# Patient Record
(demographics unavailable — no encounter records)

---

## 2017-12-22 NOTE — DIAGNOSTIC IMAGING REPORT
L HUMERUS MIN 2 VIEWS ROUTINE



CLINICAL HISTORY: Left upper extremity pain following fall.    



COMPARISON: None



FINDINGS:  There is an acute displaced fracture of the proximal shaft of the

left humerus which is predominantly transverse in orientation. Fracture is

displaced 5 mm. No additional fractures are identified. No underlying lesion is

identified by radiography. Alignment of the left shoulder and elbow appears

anatomic.



IMPRESSION: Acute moderately displaced fracture of the proximal shaft of the

left humerus.







Electronically signed by:  John Zhang M.D.

12/22/2017 9:51 AM



Dictated Date/Time:  12/22/2017 9:48 AM

## 2017-12-22 NOTE — EMERGENCY ROOM VISIT NOTE
History


First contact with patient:  08:53


Chief Complaint:  ARM PAIN


Stated Complaint:  ARM PAIN FROM FALL





History of Present Illness


The patient is a 4Y 3M year old female who presents to the Emergency Room with 

complaints of injuries after she fell off of a little tyke sliding board last 

night, and has had pain overnight.  The mother reports that she complains of 

pain of the left upper arm and shoulder region.  On my exam, the patient 

complains and points to her elbow as the source of her pain.  She denies any 

headache, neck pain or back pain.  The mother has not noticed any shortness of 

breath or other acute findings on exam.  The patient rates her discomfort a 5 

out of 10 on the pediatric pain scale.





Review of Systems


10 system review was performed with the patient and mother, and was negative 

except for pertinent positives and negatives as indicated in history of present 

illness





Past Medical/Surgical History


Medical Problems:


(1) No significant past medical history


Surgical Problems:


(1) No history of previous surgery








Family History





No significant family history





Social History


Smoking Status:  Never Smoker


Alcohol Use:  none


Marital Status:  single


Housing Status:  lives with family


Occupation Status:   / 





Current/Historical Medications


Scheduled


Sodium Fluoride (Fluoride), 0.25 MG PO DAILY





Physical Exam


Vital Signs











  Date Time  Temp Pulse Resp B/P (MAP) Pulse Ox O2 Delivery O2 Flow Rate FiO2


 


12/22/17 12:45  90 18  98   


 


12/22/17 10:38  98 18  99 Room Air  


 


12/22/17 08:47 36.8 103 18 111/74 98 Room Air  











Physical Exam


CONSTITUTIONAL:  Healthy and well nourished.  Patient does not appear in any 

acute distress on exam.


HEENT:  Normocephalic, atraumatic.  Pupils equal, round and reactive.  


NECK:  Full active range of motion without discomfort.


GASTROINTESTINAL:  Bowel sounds present in all quadrants.  Soft and nontender 

to palpation.


MUSCULOSKELETAL: Careful examination shows no worsening pain with flexion, 

extension, pronation or supination of the forearm and elbow.  She also has no 

tenderness about the wrist or hand.  She has no focal tenderness through the 

distal clavicle or acromioclavicular joint.  Distal pulses are intact.


INTEGUMENTARY:  No rash or other significant dermatologic conditions noted.


NEUROLOGIC: Left upper extremity appears to be grossly sensory intact.





Medical Decision & Procedures


ER Provider


Diagnostic Interpretation:


My interpretation of left humerus x-ray shows a proximal humerus fracture with 

a 5 mm displacement.  No other acute shoulder or elbow fractures/dislocations 

noted.  Radiologist report is as follows:





L HUMERUS MIN 2 VIEWS ROUTINE





CLINICAL HISTORY: Left upper extremity pain following fall.    





COMPARISON: None





FINDINGS:  There is an acute displaced fracture of the proximal shaft of the


left humerus which is predominantly transverse in orientation. Fracture is


displaced 5 mm. No additional fractures are identified. No underlying lesion is


identified by radiography. Alignment of the left shoulder and elbow appears


anatomic.





IMPRESSION: Acute moderately displaced fracture of the proximal shaft of the


left humerus.





Medications Administered











 Medications


  (Trade)  Dose


 Ordered  Sig/Dio


 Route  Start Time


 Stop Time Status Last Admin


Dose Admin


 


 Acetaminophen/


 Hydrocodone Bitart


  (Hydrocod/Apap


 Elix 7.5/325MG/


 15ML Home Pack)  1 homepack  UD  ONCE


 PO  12/22/17 12:00


 12/22/17 12:01 DC 12/22/17 12:11


1 HOMEPACK











ED Course


Patient history and physical exam were performed.  Nurse's notes were reviewed.

  Vital signs were reviewed and were normal.  The patient refused any 

analgesics.  X-rays of the left humerus confirms a proximal humerus fracture.  

The case was further discussed with Dr. Mckeon, ED attending physician, who 

suggested consultation with the on-call orthopedic surgeon.  I then spoke with 

Gregg Jeffries PA-C who al o reviewed x-rays with Dr. Marte.  They suggested 

that the patient be referred to a pediatric orthopedic surgeon.  Given the 

patient's insurance being through [facility] insurance, I also elected to call 

[facility] for further guidance.  Dr. Pleitez reviewed x-

rays and came to the emergency department for further evaluation.  The patient 

was ultimately placed in an arm sling and swath after being administered Lortab 

elixir from a home pack, which was also sent home with the family.  The patient 

will follow-up next Thursday with Dr. Pleitez.  The parents were happy with 

plan of care, and the patient denied any significant pain at the time of 

discharge.





Medical Decision








Blood Pressure Screening


Patient's blood pressure:  Normal blood pressure





Impression





 Primary Impression:  


 Closed fracture of left proximal humerus





Departure Information


Referrals


No Doctor, Assigned (PCP)





Patient Instructions


My [facility]





Problem Qualifiers








 Primary Impression:  


 Closed fracture of left proximal humerus


 Encounter type:  initial encounter  Fracture morphology:  other fracture  

Fracture alignment:  displaced  Qualified Codes:  S42.292A - Other displaced 

fracture of upper end of left humerus, initial encounter for closed fracture

## 2017-12-22 NOTE — ORTHOPEDIC CONSULTATION
Orthopedic Consultation


Date of Consultation:


Dec 22, 2017.


Attending Physician:





Reason for Consultation:


Left proximal humerus fracture


History of Present Illness


4 yof fell yesterday off a Little [employer] slide at home, height of about 2 feet.  

Dad was outside with her, but did not witness the fall.  She complained of pain

, which continued into this morning, so her parents brought her in.  No numbness

/tingling.  No history of other fractures. No numbness/tingling





Family History





No significant family history





Social History


Smoking Status:  Never Smoker


Smokeless Tobacco Use:  No


Alcohol Use:  none


Drug Use:  none


Marital Status:  single


Housing Status:  lives with family





Allergies


Coded Allergies:  


     No Known Allergies (Unverified , 12/22/17)





Home Medications


Scheduled


Sodium Fluoride (Fluoride), 0.25 MG PO DAILY





Review of Systems


Constitutional:  No fever, No chills


Respiratory:  No cough


Cardiovascular:  No chest pain


Musculoskeletal:  + muscle pain, + swelling


Neurologic:  No numbness/tingling





Physical Exam











  Date Time  Temp Pulse Resp B/P (MAP) Pulse Ox O2 Delivery O2 Flow Rate FiO2


 


12/22/17 10:38  98 18  99 Room Air  


 


12/22/17 08:47 36.8 103 18 111/74 98 Room Air  








General Appearance:  + mild distress


Head:  normocephalic, atraumatic


Eyes:  normal inspection


Respiratory/Chest:  no respiratory distress, no accessory muscle use


Cardiovascular:  no edema


Extremities/Musculoskelatal:  + swelling (Swelling of L proximal humerus with 

tenderness to palpation.  Fires EPL, FPL, interossei, wrist extensors and 

flexors.  SILT MURA nerve distributions.  Fingers w/wp), + pertinent finding


Neurologic/Psych:  no motor/sensory deficits, normal mood/affect


Skin:  normal color, warm/dry





Laboratory Results


AP and lateral X-rays show proximal humerus fracture with mild displacement on 

the lateral view, minimal angulation on either view





Assessment & Plan


Plan: Discussed the nature of this fracture with Kayla's parents.  I recommend 

nonsurgical treatment with a sling and swathe.  We applied this for her today 

after she received some oral pain medication.  She will need sponge baths for 

the next week.  Follow-up with me next Thursday morning at [facility] 

with x-rays